# Patient Record
Sex: MALE | Race: WHITE | NOT HISPANIC OR LATINO
[De-identification: names, ages, dates, MRNs, and addresses within clinical notes are randomized per-mention and may not be internally consistent; named-entity substitution may affect disease eponyms.]

---

## 2018-01-23 PROBLEM — Z00.00 ENCOUNTER FOR PREVENTIVE HEALTH EXAMINATION: Status: ACTIVE | Noted: 2018-01-23

## 2018-02-07 ENCOUNTER — APPOINTMENT (OUTPATIENT)
Dept: HEART AND VASCULAR | Facility: CLINIC | Age: 53
End: 2018-02-07
Payer: COMMERCIAL

## 2018-02-07 VITALS
HEIGHT: 71 IN | HEART RATE: 90 BPM | DIASTOLIC BLOOD PRESSURE: 88 MMHG | SYSTOLIC BLOOD PRESSURE: 120 MMHG | WEIGHT: 187 LBS | OXYGEN SATURATION: 97 % | BODY MASS INDEX: 26.18 KG/M2

## 2018-02-07 PROCEDURE — 93880 EXTRACRANIAL BILAT STUDY: CPT | Mod: XE

## 2018-02-07 PROCEDURE — 93306 TTE W/DOPPLER COMPLETE: CPT

## 2018-02-07 PROCEDURE — 99204 OFFICE O/P NEW MOD 45 MIN: CPT | Mod: 25

## 2018-03-01 ENCOUNTER — MOBILE ON CALL (OUTPATIENT)
Age: 53
End: 2018-03-01

## 2019-03-01 ENCOUNTER — APPOINTMENT (OUTPATIENT)
Dept: HEART AND VASCULAR | Facility: CLINIC | Age: 54
End: 2019-03-01

## 2019-07-22 ENCOUNTER — APPOINTMENT (OUTPATIENT)
Dept: HEART AND VASCULAR | Facility: CLINIC | Age: 54
End: 2019-07-22
Payer: COMMERCIAL

## 2019-07-22 VITALS
SYSTOLIC BLOOD PRESSURE: 125 MMHG | WEIGHT: 190 LBS | HEART RATE: 67 BPM | BODY MASS INDEX: 26.5 KG/M2 | OXYGEN SATURATION: 97 % | RESPIRATION RATE: 14 BRPM | DIASTOLIC BLOOD PRESSURE: 80 MMHG | TEMPERATURE: 98.3 F

## 2019-07-22 PROCEDURE — 93880 EXTRACRANIAL BILAT STUDY: CPT

## 2019-07-22 PROCEDURE — 93306 TTE W/DOPPLER COMPLETE: CPT

## 2019-07-22 PROCEDURE — 99214 OFFICE O/P EST MOD 30 MIN: CPT

## 2019-07-22 PROCEDURE — 99214 OFFICE O/P EST MOD 30 MIN: CPT | Mod: 25

## 2019-07-22 RX ORDER — ATORVASTATIN CALCIUM 10 MG/1
10 TABLET, FILM COATED ORAL
Qty: 90 | Refills: 3 | Status: ACTIVE | COMMUNITY
Start: 2018-03-01 | End: 1900-01-01

## 2019-07-22 NOTE — HISTORY OF PRESENT ILLNESS
[FreeTextEntry1] : f/u of cad- non-obst by CTA last year\par no neuro sx\par normal functional capacity, he exercises reg\par no cp or dyspnea\par tolerating the statin and his recent LDL w Dr Turner was 90

## 2019-07-22 NOTE — DISCUSSION/SUMMARY
[___ Month(s)] : [unfilled] month(s) [FreeTextEntry3] : echo, crtd and treadmill stress [With Me] : with me [FreeTextEntry1] : CAD- no angina, cont current statin\par yearly stress test, repeat CTA in 5 yrs

## 2019-07-22 NOTE — PHYSICAL EXAM
[Normal Appearance] : normal appearance [Well Groomed] : well groomed [General Appearance - Well Developed] : well developed [General Appearance - Well Nourished] : well nourished [No Deformities] : no deformities [General Appearance - In No Acute Distress] : no acute distress [Eyelids - No Xanthelasma] : the eyelids demonstrated no xanthelasmas [Normal Conjunctiva] : the conjunctiva exhibited no abnormalities [Normal Oral Mucosa] : normal oral mucosa [No Oral Pallor] : no oral pallor [No Oral Cyanosis] : no oral cyanosis [Normal Jugular Venous V Waves Present] : normal jugular venous V waves present [Normal Jugular Venous A Waves Present] : normal jugular venous A waves present [No Jugular Venous Urrutia A Waves] : no jugular venous urrutia A waves [Respiration, Rhythm And Depth] : normal respiratory rhythm and effort [Exaggerated Use Of Accessory Muscles For Inspiration] : no accessory muscle use [Heart Rate And Rhythm] : heart rate and rhythm were normal [Auscultation Breath Sounds / Voice Sounds] : lungs were clear to auscultation bilaterally [Murmurs] : no murmurs present [Heart Sounds] : normal S1 and S2 [Abdomen Tenderness] : non-tender [Abdomen Soft] : soft [Abdomen Mass (___ Cm)] : no abdominal mass palpated [Abnormal Walk] : normal gait [Nail Clubbing] : no clubbing of the fingernails [Gait - Sufficient For Exercise Testing] : the gait was sufficient for exercise testing [Cyanosis, Localized] : no localized cyanosis [Petechial Hemorrhages (___cm)] : no petechial hemorrhages [No Venous Stasis] : no venous stasis [] : no rash [Skin Color & Pigmentation] : normal skin color and pigmentation [Skin Lesions] : no skin lesions [No Skin Ulcers] : no skin ulcer [No Xanthoma] : no  xanthoma was observed [Mood] : the mood was normal [Affect] : the affect was normal [Oriented To Time, Place, And Person] : oriented to person, place, and time [No Anxiety] : not feeling anxious

## 2021-03-21 ENCOUNTER — NON-APPOINTMENT (OUTPATIENT)
Age: 56
End: 2021-03-21

## 2021-04-15 ENCOUNTER — NON-APPOINTMENT (OUTPATIENT)
Age: 56
End: 2021-04-15

## 2021-04-15 DIAGNOSIS — Z78.9 OTHER SPECIFIED HEALTH STATUS: ICD-10-CM

## 2021-04-15 NOTE — HISTORY OF PRESENT ILLNESS
[FreeTextEntry1] : This 56 year-old male patient presents for cardiovascular evaluation.\par \par His problem list is as noted above.\par \par THIS IS A CHART UPDATE PREPARED IN ADVANCE OF A FUTURE APPOINTMENT. THE ENTRY BELOW IS FROM THE PRIOR NOTE.\par

## 2021-04-15 NOTE — REASON FOR VISIT
[FreeTextEntry1] : Mr. JOHN HAYES has the following problem list:\par \par Abnormal EKG\par Dyslipidemia\par Hypertension\par Nonobstructive coronary atherosclerosis\par Carotid atherosclerosis\par \par He has additional medical problems as noted\par \par His primary care physician is Dr. Justo Turner ([facility]).

## 2021-05-03 ENCOUNTER — NON-APPOINTMENT (OUTPATIENT)
Age: 56
End: 2021-05-03

## 2021-05-03 DIAGNOSIS — Z78.9 OTHER SPECIFIED HEALTH STATUS: ICD-10-CM

## 2021-05-04 ENCOUNTER — NON-APPOINTMENT (OUTPATIENT)
Age: 56
End: 2021-05-04

## 2021-05-04 ENCOUNTER — APPOINTMENT (OUTPATIENT)
Dept: CARDIOLOGY | Facility: CLINIC | Age: 56
End: 2021-05-04
Payer: COMMERCIAL

## 2021-05-04 VITALS
WEIGHT: 197 LBS | BODY MASS INDEX: 27.58 KG/M2 | SYSTOLIC BLOOD PRESSURE: 150 MMHG | HEIGHT: 71 IN | DIASTOLIC BLOOD PRESSURE: 90 MMHG | HEART RATE: 72 BPM | TEMPERATURE: 97.7 F

## 2021-05-04 VITALS — SYSTOLIC BLOOD PRESSURE: 148 MMHG | DIASTOLIC BLOOD PRESSURE: 100 MMHG

## 2021-05-04 DIAGNOSIS — R07.89 OTHER CHEST PAIN: ICD-10-CM

## 2021-05-04 PROCEDURE — 93000 ELECTROCARDIOGRAM COMPLETE: CPT

## 2021-05-04 PROCEDURE — 99072 ADDL SUPL MATRL&STAF TM PHE: CPT

## 2021-05-04 PROCEDURE — 99214 OFFICE O/P EST MOD 30 MIN: CPT

## 2021-05-04 NOTE — HISTORY OF PRESENT ILLNESS
[FreeTextEntry1] : This 56 year-old male patient presents for cardiovascular evaluation.\par \par His problem list is as noted above.\par \par This is the patient's first visit after recent evaluation at [facility].  At that time he presented with chest discomfort which was deemed noncardiac in nature.  Of note however is that he was found with a highly abnormal EKG.  On further investigation these findings have been noted for many years.  He has had a extensive evaluation by his previous cardiologist.  No obvious diagnosis was present.\par \par Since his hospitalization in March he notes no new events.  He has been active and exercising regularly.  No symptoms of chest discomfort, shortness of breath, palpitation, lightheadedness, fainting.\par \par He has seen his primary care physician and had follow-up laboratories.

## 2021-05-04 NOTE — CARDIOLOGY SUMMARY
[de-identified] : EKG part/4/21.  Sinus rhythm.  Diffuse ST-T wave abnormalities with slight ST elevation V1 through V3 and deep T wave inversion inferolateral leads and across the precordium.  No significant change. [de-identified] : Reported prior stress echocardiogram normal. [de-identified] : Echo.  2019.  Normal wall thickness.  Normal valve morphology.  Mild mitral insufficiency.  Trace tricuspid insufficiency. [de-identified] : Carotid duplex.  2/7/2018.  Minor plaque without obstructive disease. [de-identified] : Coronary CT angiogram.  2018.  Total calcium score 5.  No obstructive disease.  Nonobstructive atherosclerosis noted in LAD and first diagonal, minor plaque.  EF greater than 80%.  LVH.

## 2021-05-04 NOTE — REASON FOR VISIT
[FreeTextEntry1] : Mr. JOHN HAYES has the following problem list:\par \par Abnormal EKG\par Dyslipidemia\par Hypertension\par Nonobstructive coronary atherosclerosis\par Carotid atherosclerosis\par \par He has additional medical problems as noted\par \par His primary care physician is Dr. Justo Turner ([facility]).

## 2021-05-04 NOTE — PHYSICAL EXAM
[Well Developed] : well developed [Well Nourished] : well nourished [No Acute Distress] : no acute distress [Normal Conjunctiva] : normal conjunctiva [Normal Venous Pressure] : normal venous pressure [No Carotid Bruit] : no carotid bruit [Normal S1, S2] : normal S1, S2 [No Murmur] : no murmur [No Rub] : no rub [No Gallop] : no gallop [Clear Lung Fields] : clear lung fields [Good Air Entry] : good air entry [Soft] : abdomen soft [No Respiratory Distress] : no respiratory distress  [Non Tender] : non-tender [No Masses/organomegaly] : no masses/organomegaly [Normal Bowel Sounds] : normal bowel sounds [Normal Gait] : normal gait [No Edema] : no edema [No Cyanosis] : no cyanosis [No Clubbing] : no clubbing [No Varicosities] : no varicosities [No Rash] : no rash [No Skin Lesions] : no skin lesions [Moves all extremities] : moves all extremities [No Focal Deficits] : no focal deficits [Normal Speech] : normal speech [Alert and Oriented] : alert and oriented [Normal memory] : normal memory

## 2021-05-04 NOTE — DISCUSSION/SUMMARY
[FreeTextEntry1] : Brief recommendations and follow-up: (see above for details)\par \par The patient needs some additional cardiovascular work-up as noted.\par I will increase his amlodipine from 5 mg to 10 mg daily.\par He will begin taking his blood pressure at home and keep a record for my review.\par A cardiac MRI will be scheduled.\par Next routine cardiology visit 3 months

## 2021-08-23 ENCOUNTER — APPOINTMENT (OUTPATIENT)
Dept: CARDIOLOGY | Facility: CLINIC | Age: 56
End: 2021-08-23

## 2021-09-15 ENCOUNTER — RESULT REVIEW (OUTPATIENT)
Age: 56
End: 2021-09-15

## 2021-09-22 ENCOUNTER — NON-APPOINTMENT (OUTPATIENT)
Age: 56
End: 2021-09-22

## 2022-05-09 ENCOUNTER — NON-APPOINTMENT (OUTPATIENT)
Age: 57
End: 2022-05-09

## 2022-05-10 ENCOUNTER — APPOINTMENT (OUTPATIENT)
Dept: CARDIOLOGY | Facility: CLINIC | Age: 57
End: 2022-05-10
Payer: COMMERCIAL

## 2022-05-10 ENCOUNTER — NON-APPOINTMENT (OUTPATIENT)
Age: 57
End: 2022-05-10

## 2022-05-10 VITALS
WEIGHT: 190 LBS | DIASTOLIC BLOOD PRESSURE: 83 MMHG | HEIGHT: 71 IN | BODY MASS INDEX: 26.6 KG/M2 | SYSTOLIC BLOOD PRESSURE: 130 MMHG

## 2022-05-10 DIAGNOSIS — U07.1 COVID-19: ICD-10-CM

## 2022-05-10 PROCEDURE — 99214 OFFICE O/P EST MOD 30 MIN: CPT

## 2022-05-10 PROCEDURE — 93000 ELECTROCARDIOGRAM COMPLETE: CPT

## 2022-05-10 NOTE — DISCUSSION/SUMMARY
[FreeTextEntry1] : Brief recommendations and follow-up: (see above for details)\par \par The patient's cardiovascular status is stable.\par His blood pressure is improved on the current dose of amlodipine.\par Lipids are satisfactory and I await upcoming laboratories.\par Echo/MRI findings as noted and I will order a follow-up echocardiogram to assess concentric LVH and the suggestion of additional apical hypertrophy, not confirmed.\par Medications reconciled and renewed.\par Next routine cardiology visit 1 year.

## 2022-05-10 NOTE — REASON FOR VISIT
[FreeTextEntry1] : Mr. JOHN HAYES has the following problem list:\par \par Abnormal EKG and echocardiogram\par Dyslipidemia\par Hypertension\par Nonobstructive coronary atherosclerosis\par Carotid atherosclerosis\par \par He has additional medical problems as noted.\par \par His primary care physician is Dr. Justo Turner ([facility]).

## 2022-05-10 NOTE — HISTORY OF PRESENT ILLNESS
[FreeTextEntry1] : This 57 year-old male patient presents for cardiovascular evaluation.\par \par His problem list is as noted above.\par \par Since his last examination 1 year ago he reports no major events or hospitalizations.  He did have a mild case of COVID and thankfully recovered well.  He is active and exercising. 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

## 2022-05-10 NOTE — CARDIOLOGY SUMMARY
[de-identified] : EKG part/4/21.  Sinus rhythm.  Diffuse ST-T wave abnormalities with slight ST elevation V1 through V3 and deep T wave inversion inferolateral leads and across the precordium.  No significant change. [de-identified] : Reported prior stress echocardiogram normal. [de-identified] : Echo.  2019.  Normal wall thickness.  Normal valve morphology.  Mild mitral insufficiency.  Trace tricuspid insufficiency. [de-identified] : Carotid duplex.  2/7/2018.  Minor plaque without obstructive disease. [de-identified] : Cardiac MRI.  8/20/2021 normal LV function.  EF 71%.  Concentric LVH with possible mild apical hypertrophic cardiomyopathy.  Normal RV.  Normal valves.  Mild MR.  No evidence of myocardial scarring.\par Coronary CT angiogram.  2018.  Total calcium score 5.  No obstructive disease.  Nonobstructive atherosclerosis noted in LAD and first diagonal, minor plaque.  EF greater than 80%.  LVH.

## 2022-05-10 NOTE — ASSESSMENT
[FreeTextEntry1] :  EKG 5/4/21. Sinus rhythm. Diffuse ST-T wave abnormalities with slight ST elevation V1 through V3 and deep T wave inversion inferolateral leads and across the precordium. No significant change

## 2022-08-24 ENCOUNTER — APPOINTMENT (OUTPATIENT)
Dept: CARDIOLOGY | Facility: CLINIC | Age: 57
End: 2022-08-24

## 2022-08-24 PROCEDURE — 93306 TTE W/DOPPLER COMPLETE: CPT

## 2023-05-23 ENCOUNTER — NON-APPOINTMENT (OUTPATIENT)
Age: 58
End: 2023-05-23

## 2023-05-23 ENCOUNTER — APPOINTMENT (OUTPATIENT)
Dept: CARDIOLOGY | Facility: CLINIC | Age: 58
End: 2023-05-23
Payer: COMMERCIAL

## 2023-05-23 VITALS
SYSTOLIC BLOOD PRESSURE: 136 MMHG | HEART RATE: 86 BPM | TEMPERATURE: 97.5 F | OXYGEN SATURATION: 98 % | BODY MASS INDEX: 27.19 KG/M2 | HEIGHT: 71 IN | RESPIRATION RATE: 18 BRPM | WEIGHT: 194.25 LBS | DIASTOLIC BLOOD PRESSURE: 76 MMHG

## 2023-05-23 DIAGNOSIS — I65.29 OCCLUSION AND STENOSIS OF UNSPECIFIED CAROTID ARTERY: ICD-10-CM

## 2023-05-23 DIAGNOSIS — R94.31 ABNORMAL ELECTROCARDIOGRAM [ECG] [EKG]: ICD-10-CM

## 2023-05-23 DIAGNOSIS — I10 ESSENTIAL (PRIMARY) HYPERTENSION: ICD-10-CM

## 2023-05-23 DIAGNOSIS — E78.5 HYPERLIPIDEMIA, UNSPECIFIED: ICD-10-CM

## 2023-05-23 DIAGNOSIS — I25.10 ATHEROSCLEROTIC HEART DISEASE OF NATIVE CORONARY ARTERY W/OUT ANGINA PECTORIS: ICD-10-CM

## 2023-05-23 PROCEDURE — 99214 OFFICE O/P EST MOD 30 MIN: CPT | Mod: 25

## 2023-05-23 PROCEDURE — 93000 ELECTROCARDIOGRAM COMPLETE: CPT

## 2023-05-23 PROCEDURE — 36415 COLL VENOUS BLD VENIPUNCTURE: CPT

## 2023-05-23 NOTE — HISTORY OF PRESENT ILLNESS
[FreeTextEntry1] : This 58 year-old male patient presents for cardiovascular evaluation.\par \par His problem list is as noted above.\par \par Since his last examination 1 year ago he reports no major events or hospitalizations.\par \par He is active and exercising regularly.  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  He also reports that he recently was on the television show [employer].\par \par He is seen at the patient and his primary care physician's request.\par \par

## 2023-05-23 NOTE — REVIEW OF SYSTEMS
[Negative] : Heme/Lymph [FreeTextEntry3] : Current left conjunctival hemorrhage. [FreeTextEntry5] : See HPI

## 2023-05-23 NOTE — ASSESSMENT
[FreeTextEntry1] : EKG 5/23/2023.  Sinus rhythm.  Diffuse T wave inversion leads I, 2, aVL, V2 through V6.  Increased voltage for left ventricular enlargement precordial leads.  Slight ST elevation in lead V1 through V3.  No significant change.\par  EKG 5/4/21. Sinus rhythm. Diffuse ST-T wave abnormalities with slight ST elevation V1 through V3 and deep T wave inversion inferolateral leads and across the precordium. No significant change

## 2023-05-23 NOTE — CARDIOLOGY SUMMARY
[de-identified] : EKG part/4/21.  Sinus rhythm.  Diffuse ST-T wave abnormalities with slight ST elevation V1 through V3 and deep T wave inversion inferolateral leads and across the precordium.  No significant change. [de-identified] : Reported prior stress echocardiogram normal. [de-identified] : Echo.  2019.  Normal wall thickness.  Normal valve morphology.  Mild mitral insufficiency.  Trace tricuspid insufficiency. [de-identified] : Cardiac MRI.  8/20/2021 normal LV function.  EF 71%.  Concentric LVH with possible mild apical hypertrophic cardiomyopathy.  Normal RV.  Normal valves.  Mild MR.  No evidence of myocardial scarring.\par Coronary CT angiogram.  2018.  Total calcium score 5.  No obstructive disease.  Nonobstructive atherosclerosis noted in LAD and first diagonal, minor plaque.  EF greater than 80%.  LVH. [de-identified] : Carotid duplex.  2/7/2018.  Minor plaque without obstructive disease.

## 2023-05-23 NOTE — DISCUSSION/SUMMARY
[FreeTextEntry1] : Brief recommendations and follow-up: (see above for details)\par \par Patient's overall cardiovascular status is well compensated.\par Blood pressure satisfactory.\par Lipid satisfactory and he will work with therapeutic lifestyle treatment for his triglycerides.\par Chronically "abnormal" EKG noted and stable.\par I will order a follow-up echocardiogram for September.\par Fasting laboratories will also be drawn at that time.\par Next routine full visit approximately 18 months.

## 2023-05-23 NOTE — REASON FOR VISIT
[FreeTextEntry1] : Mr. JOHN HAYES has the following problem list:\par \par Abnormal EKG and echocardiogram\par Dyslipidemia\par Hypertension\par Nonobstructive coronary atherosclerosis\par Carotid atherosclerosis\par \par He has additional medical problems as noted.\par \par His primary care physician is Dr. Justo Turner ([facility]).

## 2023-06-05 ENCOUNTER — NON-APPOINTMENT (OUTPATIENT)
Age: 58
End: 2023-06-05

## 2023-09-06 ENCOUNTER — APPOINTMENT (OUTPATIENT)
Dept: CARDIOLOGY | Facility: CLINIC | Age: 58
End: 2023-09-06
Payer: COMMERCIAL

## 2023-09-06 DIAGNOSIS — Z92.89 PERSONAL HISTORY OF OTHER MEDICAL TREATMENT: ICD-10-CM

## 2023-09-06 DIAGNOSIS — R93.1 ABNORMAL FINDINGS ON DIAGNOSTIC IMAGING OF HEART AND CORONARY CIRCULATION: ICD-10-CM

## 2023-09-06 PROCEDURE — 93306 TTE W/DOPPLER COMPLETE: CPT

## 2023-09-06 PROCEDURE — 36415 COLL VENOUS BLD VENIPUNCTURE: CPT

## 2023-09-18 LAB
ALBUMIN SERPL ELPH-MCNC: 4.8 G/DL
ALP BLD-CCNC: 78 U/L
ALT SERPL-CCNC: 48 U/L
ANION GAP SERPL CALC-SCNC: 16 MMOL/L
AST SERPL-CCNC: 36 U/L
BILIRUB SERPL-MCNC: 0.9 MG/DL
BUN SERPL-MCNC: 18 MG/DL
CALCIUM SERPL-MCNC: 9.6 MG/DL
CHLORIDE SERPL-SCNC: 106 MMOL/L
CHOLEST SERPL-MCNC: 172 MG/DL
CK SERPL-CCNC: 100 U/L
CO2 SERPL-SCNC: 20 MMOL/L
CREAT SERPL-MCNC: 1.09 MG/DL
EGFR: 80 ML/MIN/1.73M2
GLUCOSE SERPL-MCNC: 110 MG/DL
HDLC SERPL-MCNC: 42 MG/DL
LDLC SERPL CALC-MCNC: 82 MG/DL
NONHDLC SERPL-MCNC: 129 MG/DL
POTASSIUM SERPL-SCNC: 4.2 MMOL/L
PROT SERPL-MCNC: 7.1 G/DL
SODIUM SERPL-SCNC: 142 MMOL/L
TRIGL SERPL-MCNC: 286 MG/DL

## 2023-11-12 ENCOUNTER — TRANSCRIPTION ENCOUNTER (OUTPATIENT)
Age: 58
End: 2023-11-12

## 2024-04-01 DIAGNOSIS — Z01.89 ENCOUNTER FOR OTHER SPECIFIED SPECIAL EXAMINATIONS: ICD-10-CM

## 2024-06-21 RX ORDER — AMLODIPINE BESYLATE 10 MG/1
10 TABLET ORAL
Qty: 90 | Refills: 3 | Status: ACTIVE | COMMUNITY
Start: 1900-01-01 | End: 1900-01-01

## 2024-06-25 ENCOUNTER — RX RENEWAL (OUTPATIENT)
Age: 59
End: 2024-06-25

## 2024-11-10 PROBLEM — Z92.89 HISTORY OF DIAGNOSTIC TESTS: Status: RESOLVED | Noted: 2021-04-15 | Resolved: 2024-11-10

## 2024-11-10 PROBLEM — Z01.89 LABORATORY EXAMINATION: Status: RESOLVED | Noted: 2021-04-15 | Resolved: 2024-11-10

## 2024-11-11 ENCOUNTER — NON-APPOINTMENT (OUTPATIENT)
Age: 59
End: 2024-11-11

## 2024-11-11 ENCOUNTER — APPOINTMENT (OUTPATIENT)
Dept: CARDIOLOGY | Facility: CLINIC | Age: 59
End: 2024-11-11
Payer: COMMERCIAL

## 2024-11-11 VITALS
OXYGEN SATURATION: 97 % | HEART RATE: 83 BPM | HEIGHT: 71 IN | DIASTOLIC BLOOD PRESSURE: 84 MMHG | BODY MASS INDEX: 25.9 KG/M2 | WEIGHT: 185 LBS | SYSTOLIC BLOOD PRESSURE: 134 MMHG

## 2024-11-11 DIAGNOSIS — I42.2 OTHER HYPERTROPHIC CARDIOMYOPATHY: ICD-10-CM

## 2024-11-11 DIAGNOSIS — R93.1 ABNORMAL FINDINGS ON DIAGNOSTIC IMAGING OF HEART AND CORONARY CIRCULATION: ICD-10-CM

## 2024-11-11 DIAGNOSIS — E78.5 HYPERLIPIDEMIA, UNSPECIFIED: ICD-10-CM

## 2024-11-11 DIAGNOSIS — I10 ESSENTIAL (PRIMARY) HYPERTENSION: ICD-10-CM

## 2024-11-11 DIAGNOSIS — I65.29 OCCLUSION AND STENOSIS OF UNSPECIFIED CAROTID ARTERY: ICD-10-CM

## 2024-11-11 PROCEDURE — 93000 ELECTROCARDIOGRAM COMPLETE: CPT

## 2024-11-11 PROCEDURE — 99215 OFFICE O/P EST HI 40 MIN: CPT | Mod: 25

## 2024-11-11 PROCEDURE — 99417 PROLNG OP E/M EACH 15 MIN: CPT

## 2024-12-09 ENCOUNTER — APPOINTMENT (OUTPATIENT)
Dept: CARDIOLOGY | Facility: CLINIC | Age: 59
End: 2024-12-09

## 2025-02-18 ENCOUNTER — APPOINTMENT (OUTPATIENT)
Dept: CARDIOLOGY | Facility: CLINIC | Age: 60
End: 2025-02-18
Payer: COMMERCIAL

## 2025-02-18 PROCEDURE — 93306 TTE W/DOPPLER COMPLETE: CPT

## 2025-02-18 PROCEDURE — 36415 COLL VENOUS BLD VENIPUNCTURE: CPT

## 2025-02-19 ENCOUNTER — TRANSCRIPTION ENCOUNTER (OUTPATIENT)
Age: 60
End: 2025-02-19

## 2025-02-19 LAB
ALBUMIN SERPL ELPH-MCNC: 4.6 G/DL
ALP BLD-CCNC: 84 U/L
ALT SERPL-CCNC: 23 U/L
ANION GAP SERPL CALC-SCNC: 14 MMOL/L
AST SERPL-CCNC: 21 U/L
BILIRUB SERPL-MCNC: 0.9 MG/DL
BUN SERPL-MCNC: 14 MG/DL
CALCIUM SERPL-MCNC: 9.8 MG/DL
CHLORIDE SERPL-SCNC: 103 MMOL/L
CHOLEST SERPL-MCNC: 154 MG/DL
CO2 SERPL-SCNC: 23 MMOL/L
CREAT SERPL-MCNC: 1.11 MG/DL
EGFR: 77 ML/MIN/1.73M2
GLUCOSE SERPL-MCNC: 90 MG/DL
HDLC SERPL-MCNC: 50 MG/DL
LDLC SERPL CALC-MCNC: 74 MG/DL
NONHDLC SERPL-MCNC: 104 MG/DL
POTASSIUM SERPL-SCNC: 4.1 MMOL/L
PROT SERPL-MCNC: 7 G/DL
SODIUM SERPL-SCNC: 140 MMOL/L
TRIGL SERPL-MCNC: 175 MG/DL

## 2025-02-21 ENCOUNTER — RESULT REVIEW (OUTPATIENT)
Age: 60
End: 2025-02-21

## 2025-02-26 ENCOUNTER — TRANSCRIPTION ENCOUNTER (OUTPATIENT)
Age: 60
End: 2025-02-26

## 2025-08-08 ENCOUNTER — RX RENEWAL (OUTPATIENT)
Age: 60
End: 2025-08-08